# Patient Record
Sex: MALE | URBAN - METROPOLITAN AREA
[De-identification: names, ages, dates, MRNs, and addresses within clinical notes are randomized per-mention and may not be internally consistent; named-entity substitution may affect disease eponyms.]

---

## 2019-10-07 ENCOUNTER — IMPORTED ENCOUNTER (OUTPATIENT)
Dept: URBAN - METROPOLITAN AREA CLINIC 9 | Facility: CLINIC | Age: 59
End: 2019-10-07

## 2020-04-01 ENCOUNTER — IMPORTED ENCOUNTER (OUTPATIENT)
Dept: URBAN - METROPOLITAN AREA CLINIC 9 | Facility: CLINIC | Age: 60
End: 2020-04-01

## 2020-05-13 ENCOUNTER — IMPORTED ENCOUNTER (OUTPATIENT)
Dept: URBAN - METROPOLITAN AREA CLINIC 9 | Facility: CLINIC | Age: 60
End: 2020-05-13

## 2021-10-16 ASSESSMENT — VISUAL ACUITY
OD_SC: 20/25 SN
OS_SC: 20/50 SN
OS_CC: 20/50 -2 SN
OS_SC: 20/20 SN
OD_CC: 20/25 + SN
OS_PH: 20/30 SN
OD_SC: 20/20 - SN

## 2021-10-16 ASSESSMENT — TONOMETRY
OS_IOP_MMHG: 19
OD_IOP_MMHG: 19
OD_IOP_MMHG: 15
OS_IOP_MMHG: 15
OD_IOP_MMHG: 17
OS_IOP_MMHG: 16

## 2022-06-21 RX ORDER — METFORMIN HYDROCHLORIDE 500 MG/1
TABLET, EXTENDED RELEASE ORAL
COMMUNITY
Start: 2017-12-18

## 2022-06-21 RX ORDER — DULAGLUTIDE 1.5 MG/.5ML
INJECTION, SOLUTION SUBCUTANEOUS
COMMUNITY
End: 2022-09-08

## 2022-08-25 NOTE — PATIENT DISCUSSION
Upon exam the patient was tender to touch with increased tenderness to the left lateral canthus and numbness to the left lower lip. No shooting nerve pain, no double vision, no palpable step-off or  no proptosis.

## 2022-08-29 NOTE — PATIENT DISCUSSION
Requested MRI/CT scan images be mailed or dropped off for [facility] evaluation in order to determine next step.

## 2022-08-29 NOTE — PATIENT DISCUSSION
During exam the patient was not experiencing pain. Bruising is subsiding. No shooting nerve pain, no double vision, no palpable step-off or no proptosis.

## 2022-08-29 NOTE — PATIENT DISCUSSION
Cataract(s) are not yet visually significant to the patient. Recommend monitoring, and patient agrees with this plan. None

## 2022-09-08 PROBLEM — E11.9 TYPE 2 DIABETES MELLITUS WITHOUT COMPLICATION (HCC): Status: ACTIVE | Noted: 2022-09-08
